# Patient Record
Sex: MALE | Race: BLACK OR AFRICAN AMERICAN | NOT HISPANIC OR LATINO | RURAL
[De-identification: names, ages, dates, MRNs, and addresses within clinical notes are randomized per-mention and may not be internally consistent; named-entity substitution may affect disease eponyms.]

---

## 2017-02-01 NOTE — PATIENT DISCUSSION
Counseling: Not visually significant to proceed with surgery at this time. I have discussed continuing with current spectacles vs updating. I have offerred the patient a new spectacle prescription to fill if desires. Return to follow up as schedled or sooner if symptoms arise.

## 2017-02-01 NOTE — PATIENT DISCUSSION
Retinal Heme Counseling:  Pt educated that cause of hemorrhage is uncertain and needs to be evaluated by retinal specialist.  Hemorrhage could me indicative of other general health issues and might affect health of eye or vision.

## 2018-03-26 ENCOUNTER — PREPPED CHART (OUTPATIENT)
Dept: RURAL CLINIC 1 | Facility: CLINIC | Age: 33
End: 2018-03-26

## 2018-05-29 NOTE — PATIENT DISCUSSION
COUNSELING: · Labs:  Obtain HA1c and CMP around 7/1/208 and then q3m thereafter    Obtain TSH 10/30 and then q6mo thereafter  · If SBP>160, please check with manual cuff

## 2020-03-19 NOTE — PATIENT DISCUSSION
Proparacaine instilled  OS. A good amount of foreign body removed at slit lamp today with syringe, without incident. Verbal consent obtained. Advised patient to call our office with decreased vision or increased symptoms.

## 2020-03-19 NOTE — PROCEDURE NOTE: CLINICAL
PROCEDURE NOTE: Removal of Corneal FB at Slit Lamp OS. Diagnosis: Corneal Foreign Body. Prior to treatment, risks/benefits/alternatives discussed including infection, loss of vision, hemorrhage, cataract, glaucoma, retinal tears or detachment. After the risks, benefits, and alternatives to the procedure were explained to the patient, informed consent was obtained. The patient, the procedure, and the correct site were identified initially. Corneal foreign body was removed using a 25 gauge needle at the slit lamp. Patient tolerated procedure well. There were no complications. Post-op instructions given. Kasey Cedillo

## 2020-03-20 NOTE — PATIENT DISCUSSION
Proparacaine instilled  OS. A good amount of rust ring removed at slit lamp today with syringe, without incident. Verbal consent obtained. Advised patient to call our office with decreased vision or increased symptoms.

## 2020-03-20 NOTE — PROCEDURE NOTE: CLINICAL
PROCEDURE NOTE: Removal of Conjunctival FB, Superficial OS. Diagnosis: Corneal Foreign Body. Anesthesia: Topical. Prior to treatment, risks/benefits/alternatives discussed including infection, loss of vision, hemorrhage, cataract, glaucoma, retinal tears or detachment. Superficial conjunctival FB removed with forcep/needle. Globe and conjunctiva intact. Patient tolerated procedure well. There were no complications. Post procedure instructions given. Carlos Cody

## 2022-02-05 ASSESSMENT — TONOMETRY
OS_IOP_MMHG: 18
OD_IOP_MMHG: 17

## 2022-02-10 ENCOUNTER — NEW PATIENT (OUTPATIENT)
Dept: RURAL CLINIC 1 | Facility: CLINIC | Age: 37
End: 2022-02-10

## 2022-02-10 DIAGNOSIS — H40.1130: ICD-10-CM

## 2022-02-10 PROCEDURE — 92004 COMPRE OPH EXAM NEW PT 1/>: CPT

## 2022-02-10 RX ORDER — NETARSUDIL AND LATANOPROST OPHTHALMIC SOLUTION, 0.02%/0.005% .2; .05 MG/ML; MG/ML: 1 SOLUTION/ DROPS OPHTHALMIC; TOPICAL EVERY EVENING

## 2022-02-10 ASSESSMENT — TONOMETRY
OD_IOP_MMHG: 16
OS_IOP_MMHG: 16

## 2022-02-10 ASSESSMENT — VISUAL ACUITY
OD_CC: 20/25
OU_CC: 20/20
OD_CC: 20/40
OS_CC: 20/30-1
OU_CC: 20/20
OS_CC: 20/20

## 2022-03-10 ENCOUNTER — CONSULTATION/EVALUATION (OUTPATIENT)
Dept: RURAL CLINIC 1 | Facility: CLINIC | Age: 37
End: 2022-03-10

## 2022-03-10 DIAGNOSIS — H40.1130: ICD-10-CM

## 2022-03-10 PROCEDURE — 92083 EXTENDED VISUAL FIELD XM: CPT

## 2022-03-10 PROCEDURE — 92014 COMPRE OPH EXAM EST PT 1/>: CPT

## 2022-03-10 ASSESSMENT — VISUAL ACUITY
OD_CC: 20/25
OU_CC: 20/25
OS_CC: 20/25-1
OS_CC: 20/25
OD_CC: 20/30-1
OU_CC: 20/20

## 2022-03-10 ASSESSMENT — TONOMETRY
OD_IOP_MMHG: 16
OS_IOP_MMHG: 16

## 2022-07-11 ENCOUNTER — FOLLOW UP (OUTPATIENT)
Dept: RURAL CLINIC 1 | Facility: CLINIC | Age: 37
End: 2022-07-11

## 2022-07-11 DIAGNOSIS — H40.1130: ICD-10-CM

## 2022-07-11 PROCEDURE — 92012 INTRM OPH EXAM EST PATIENT: CPT

## 2022-07-11 ASSESSMENT — TONOMETRY
OD_IOP_MMHG: 15
OS_IOP_MMHG: 15

## 2022-07-11 ASSESSMENT — VISUAL ACUITY
OU_CC: 20/20
OS_CC: 20/25
OD_CC: 20/30

## 2022-11-10 ENCOUNTER — FOLLOW UP (OUTPATIENT)
Dept: RURAL CLINIC 1 | Facility: CLINIC | Age: 37
End: 2022-11-10

## 2022-11-10 DIAGNOSIS — H40.1131: ICD-10-CM

## 2022-11-10 PROCEDURE — 92012 INTRM OPH EXAM EST PATIENT: CPT

## 2022-11-10 ASSESSMENT — VISUAL ACUITY
OD_CC: 20/20
OS_CC: 20/20
OD_CC: 20/40
OU_CC: 20/20
OU_CC: 20/25
OS_CC: 20/30-1

## 2022-11-10 ASSESSMENT — TONOMETRY
OD_IOP_MMHG: 15
OS_IOP_MMHG: 15

## 2023-07-06 ENCOUNTER — FOLLOW UP (OUTPATIENT)
Dept: RURAL CLINIC 1 | Facility: CLINIC | Age: 38
End: 2023-07-06

## 2023-07-06 DIAGNOSIS — H40.1131: ICD-10-CM

## 2023-07-06 PROCEDURE — 92083 EXTENDED VISUAL FIELD XM: CPT

## 2023-07-06 PROCEDURE — 92012 INTRM OPH EXAM EST PATIENT: CPT

## 2023-07-06 ASSESSMENT — TONOMETRY
OS_IOP_MMHG: 12
OD_IOP_MMHG: 12

## 2023-07-06 ASSESSMENT — VISUAL ACUITY
OD_PH: 20/25-1
OU_CC: 20/20
OD_CC: 20/40+2
OU_CC: 20/20-1
OS_CC: 20/20

## 2023-12-21 ENCOUNTER — FOLLOW UP (OUTPATIENT)
Dept: RURAL CLINIC 1 | Facility: CLINIC | Age: 38
End: 2023-12-21

## 2023-12-21 DIAGNOSIS — H40.1131: ICD-10-CM

## 2023-12-21 PROCEDURE — 99214 OFFICE O/P EST MOD 30 MIN: CPT

## 2023-12-21 PROCEDURE — 92133 CPTRZD OPH DX IMG PST SGM ON: CPT

## 2023-12-21 ASSESSMENT — TONOMETRY
OD_IOP_MMHG: 13
OS_IOP_MMHG: 13

## 2023-12-21 ASSESSMENT — VISUAL ACUITY
OS_CC: 20/25
OD_CC: 20/30
OU_CC: 20/25

## 2024-06-20 ENCOUNTER — FOLLOW UP (OUTPATIENT)
Dept: RURAL CLINIC 1 | Facility: CLINIC | Age: 39
End: 2024-06-20

## 2024-06-20 DIAGNOSIS — H40.1131: ICD-10-CM

## 2024-06-20 PROCEDURE — 92012 INTRM OPH EXAM EST PATIENT: CPT

## 2024-06-20 ASSESSMENT — TONOMETRY
OD_IOP_MMHG: 13
OS_IOP_MMHG: 14

## 2024-06-20 ASSESSMENT — VISUAL ACUITY
OD_CC: 20/25
OS_CC: 20/20

## 2024-12-05 ENCOUNTER — COMPREHENSIVE EXAM (OUTPATIENT)
Age: 39
End: 2024-12-05

## 2024-12-05 DIAGNOSIS — H40.1131: ICD-10-CM

## 2024-12-05 PROCEDURE — 92014 COMPRE OPH EXAM EST PT 1/>: CPT

## 2024-12-05 PROCEDURE — 92083 EXTENDED VISUAL FIELD XM: CPT

## 2025-06-12 ENCOUNTER — FOLLOW UP (OUTPATIENT)
Age: 40
End: 2025-06-12

## 2025-06-12 DIAGNOSIS — H40.1131: ICD-10-CM

## 2025-06-12 PROCEDURE — 99213 OFFICE O/P EST LOW 20 MIN: CPT

## 2025-06-12 PROCEDURE — 92133 CPTRZD OPH DX IMG PST SGM ON: CPT
